# Patient Record
Sex: FEMALE | URBAN - METROPOLITAN AREA
[De-identification: names, ages, dates, MRNs, and addresses within clinical notes are randomized per-mention and may not be internally consistent; named-entity substitution may affect disease eponyms.]

---

## 2017-05-16 ENCOUNTER — TELEPHONE (OUTPATIENT)
Dept: SURGERY | Facility: CLINIC | Age: 52
End: 2017-05-16

## 2017-05-16 NOTE — TELEPHONE ENCOUNTER
----- Message from Ankur Zuluaga sent at 5/16/2017 10:11 AM CDT -----  Regarding: Pt requesting clinic consult for Intra-Gastric Balloon surgery  Contact: [phone]  No history of WLS.     Current Height: 5'4 Current Weight: 212lbs.    Thanks,  Ankur    Referral / Discharge Coordinator

## 2017-05-16 NOTE — TELEPHONE ENCOUNTER
"Patient meets criteria for gastric balloon. She will call back with plan.  Also discussed medical weight management and weight loss surgery options.    Pre-Intragastric Balloon Screening  : 65  Age (< 69 yrs to qualify): 52 yrs   Ht: 5'4\"  Wt:   212 lbs  BMI (30-40 to qualify): 36.4  Are you interested in the self pay option for $8,250.00? yes  Insurance confirmation: [employer]  Are you willing to have us submit insurance claims for medications and visits? yes  Do you have any of the following (no or unknown to meet with surgeon):    y=yes, n=no, u=unknown, na=not applicable  _n_ prior gastrointestinal surgery  (abdominal surgery hx for ectopic pregnancy)  _n_ prior weight loss surgery  _n_ ulcers, irritation, inflammation or inflammatory disease of the GI tract  _n_ problems with bleeding or blood clots  _n_ hepatic insufficiency, liver cirrhosis or problems with your liver  _n_ any structural abnormalities or congenital anomalies of the GI tract  _u_ a hiatal hernia > 5 cm or < 5 cm with severe reflux  _n_ problems swallowing  _n_ severe motility disorder (or gastroparesis)  _n_ a stomach mass  _n_ allergic to silicone  _n_ any medical condition that would not permit an elective endoscopy  _n_ breastfeeding, pregnant or planning to become pregnant within 6 months of having the balloon  _n_ serious or uncontrolled psychiatric disorder that could compromise patient understanding or compliance with follow-up visits and removal of device after 6 months  _n_ alcoholism or drug addiction  Are you taking any of the following:  _n_ aspirin         ___per provider  ___not under medical supervision  _n_ anti-inflammatory agents (ie., NSAIDS)   ___per provider  ___not under medical supervision  _n_ anticoagulants (blood thinners)   ___per provider  ___not under medical supervision  _n_ other gastric irritants    ___per provider  ___not under medical supervision  _n_ medication to treat gastric reflux or heart " "burn   ___per provider  ___not under medical supervision  _yes__Are you willing to take medication (called a proton pump inhibitor) during the time the balloon is in place that will decrease stomach acid?    Next Steps reviewed with client.  Also reviewed criteria for weight loss surgery (she does not have HTN, FENG or DN so would not qualify) and medical weight management (given number to schedule).  She would like to review information and call back with her decision.    ___Appt with Dr Bauer. (NEW appt, write \"Interested in IntraGastric Balloon\" in the comments.  [place] in Epic and MyChart.   ___Appt with Dietician.  Do pre-visit questionnaire for NEW visit.  ___Toni to email Loretta Knox at [email or web address] with name, MRN and appt info of patient interested in the gastric balloon.    ___Patient to Call Loretta Knox (Financial): [facility] in the morning at [phone] or [address] in the afternoon at [phone].  ___Call Scott to schedule, # [phone]  "